# Patient Record
Sex: MALE | Race: WHITE | NOT HISPANIC OR LATINO | Employment: OTHER | URBAN - METROPOLITAN AREA
[De-identification: names, ages, dates, MRNs, and addresses within clinical notes are randomized per-mention and may not be internally consistent; named-entity substitution may affect disease eponyms.]

---

## 2022-03-15 DIAGNOSIS — R52 PAIN: Primary | ICD-10-CM

## 2022-03-15 RX ORDER — PREGABALIN 50 MG/1
50 CAPSULE ORAL 2 TIMES DAILY
Qty: 120 CAPSULE | Refills: 0 | Status: SHIPPED | OUTPATIENT
Start: 2022-03-15

## 2022-03-15 RX ORDER — PREGABALIN 50 MG/1
50 CAPSULE ORAL 2 TIMES DAILY
COMMUNITY
Start: 2022-02-04 | End: 2022-03-15

## 2022-03-16 ENCOUNTER — DOCUMENTATION ONLY (OUTPATIENT)
Dept: OTHER | Facility: CLINIC | Age: 77
End: 2022-03-16

## 2022-03-17 ENCOUNTER — TELEPHONE (OUTPATIENT)
Dept: GERIATRICS | Facility: CLINIC | Age: 77
End: 2022-03-17

## 2022-03-17 NOTE — TELEPHONE ENCOUNTER
[facility] ORDERS    Chief Complaint   Patient presents with     Orders     Lorenzo Prado is a 76 year old  (1945)    Orders:  Please change Acetaminophen to 1000 mg PO BID and BID PRN Dx: Pain  Please schedule Senna-S to 2 tablets PO BID Dx: Constipation    Electronically signed by:   RU Otoole CNP

## 2022-03-22 ENCOUNTER — TELEPHONE (OUTPATIENT)
Dept: GERIATRICS | Facility: CLINIC | Age: 77
End: 2022-03-22

## 2022-03-22 NOTE — TELEPHONE ENCOUNTER
[facility] ORDERS    Chief Complaint   Patient presents with     Orders     Lorenzo Prado  (1945)    ORDERS:    Shingrix Vaccine 0.5 ml IM x 1 Dx: Preventative    Electronically signed by:   RU Otoole CNP

## 2022-03-23 ENCOUNTER — ASSISTED LIVING VISIT (OUTPATIENT)
Dept: GERIATRICS | Facility: CLINIC | Age: 77
End: 2022-03-23
Payer: MEDICARE

## 2022-03-23 VITALS
DIASTOLIC BLOOD PRESSURE: 84 MMHG | SYSTOLIC BLOOD PRESSURE: 161 MMHG | HEART RATE: 93 BPM | RESPIRATION RATE: 18 BRPM | WEIGHT: 161 LBS | TEMPERATURE: 96.7 F

## 2022-03-23 DIAGNOSIS — K59.00 CONSTIPATION, UNSPECIFIED CONSTIPATION TYPE: ICD-10-CM

## 2022-03-23 DIAGNOSIS — I10 BENIGN ESSENTIAL HYPERTENSION: ICD-10-CM

## 2022-03-23 DIAGNOSIS — G25.0 ESSENTIAL TREMOR: ICD-10-CM

## 2022-03-23 DIAGNOSIS — E78.5 HYPERLIPIDEMIA, UNSPECIFIED HYPERLIPIDEMIA TYPE: ICD-10-CM

## 2022-03-23 DIAGNOSIS — Z00.00 PREVENTATIVE HEALTH CARE: ICD-10-CM

## 2022-03-23 DIAGNOSIS — M48.062 SPINAL STENOSIS OF LUMBAR REGION WITH NEUROGENIC CLAUDICATION: Primary | ICD-10-CM

## 2022-03-23 DIAGNOSIS — K21.00 GASTROESOPHAGEAL REFLUX DISEASE WITH ESOPHAGITIS, UNSPECIFIED WHETHER HEMORRHAGE: ICD-10-CM

## 2022-03-23 DIAGNOSIS — Z53.9 DIAGNOSIS NOT YET DEFINED: Primary | ICD-10-CM

## 2022-03-23 DIAGNOSIS — G47.33 OSA (OBSTRUCTIVE SLEEP APNEA): ICD-10-CM

## 2022-03-23 DIAGNOSIS — R53.81 PHYSICAL DECONDITIONING: ICD-10-CM

## 2022-03-23 DIAGNOSIS — Z98.890 S/P SPINAL SURGERY: ICD-10-CM

## 2022-03-23 PROCEDURE — G0180 MD CERTIFICATION HHA PATIENT: HCPCS | Performed by: NURSE PRACTITIONER

## 2022-03-23 RX ORDER — ACETAMINOPHEN 500 MG
1000 TABLET ORAL 2 TIMES DAILY
COMMUNITY

## 2022-03-23 RX ORDER — METHOCARBAMOL 500 MG/1
500 TABLET, FILM COATED ORAL 2 TIMES DAILY
COMMUNITY

## 2022-03-23 RX ORDER — TRIAMTERENE AND HYDROCHLOROTHIAZIDE 37.5; 25 MG/1; MG/1
1 CAPSULE ORAL EVERY MORNING
COMMUNITY

## 2022-03-23 RX ORDER — SENNOSIDES 8.6 MG
2 TABLET ORAL 2 TIMES DAILY
COMMUNITY
End: 2022-03-24

## 2022-03-23 RX ORDER — AMLODIPINE BESYLATE 5 MG/1
5 TABLET ORAL DAILY
COMMUNITY

## 2022-03-23 RX ORDER — CALCIUM CARBONATE 500 MG/1
2 TABLET, CHEWABLE ORAL 3 TIMES DAILY PRN
COMMUNITY

## 2022-03-23 RX ORDER — ATORVASTATIN CALCIUM 20 MG/1
20 TABLET, FILM COATED ORAL DAILY
COMMUNITY

## 2022-03-23 RX ORDER — NICOTINE POLACRILEX 4 MG/1
20 GUM, CHEWING ORAL
COMMUNITY

## 2022-03-23 RX ORDER — ACETAMINOPHEN 500 MG
1000 TABLET ORAL 2 TIMES DAILY PRN
COMMUNITY

## 2022-03-23 NOTE — LETTER
3/23/2022        RE: Lorenzo Prado  [facility]  [address]  [place] [address]        [facility]  PRIMARY CARE PROVIDER AND CLINIC:  Bettye Macario, RU CNP, [address] [facility] [address] / [place] [address]    Chief Complaint   Patient presents with     [facility] Medical Record Number:  [id number]  Place of Service where encounter took place:  [facility] [[id number]]    Lorenzo Prado  is a 76 year old  (1945), admitted to the above facility from home due to care needs related to recent surgery.      HPI:    This is a 76-year-old male, with a past medical history significant for lumbar spinal stenosis with radiculopathy, essential tremor, hypertension, prostate cancer s/p prostatectomy, GERD and hyperlipidemia, who was admitted to [facility] 2/17/22 through 2/24/22 for a posterior decompression of the lumbar spine at L4-S1. Hemoglobin 16.6 on 2/16/22 -> 14.3 on 2/18/22. Physical Therapy recommended a TCU stay for ongoing physical rehabilitation. Was discharged to [facility]CU in [place] from 2/24/22 through 3/15/22 before admitting to [facility] in [place] ([facility]) for a short-term stay.     Today, patient describes events leading up to hospitalization. Received multiple Neurosurgery consults before agreeing to undergo procedure at [facility]. Was in so much pain prior to surgery that he was receiving spinal injections, using a wheelchair and taking Dilaudid. Was hoping to make a faster recovery than he has as he talked to other people who had this surgery that did not require a TCU stay. Has found Physical Therapy to be beneficial. Worked on steps at the TCU. Walked around the building a longer distance than he had been and experienced increased pain. Spoke to PT who recommended taking it slower and gradually increasing his steps. Has been using a lumbar  tiffanie. Instructed to follow no BLT (bend, lift, twist) for 6 weeks. Had a sleep study in August that found 48 periods of apnea and uses a CPAP. Requests the water in his CPAP to be filled since he can't lift over 10 lbs during visit. Has 6 steps in his house that area small and he feels he will be able to manage. Is planning to discharge home to [place], [place] in the next couple of weeks when his son can drive him. Lives alone. Talks about his wife, their careers and travels together. Also would like his Shingrix shot today. Received the previous dose at [facility] on 9/29/21.    Feels he is cognitive able to manage his medications and would like to stop medication management through the facility as this is costing him a significant amount of money.    CODE STATUS/ADVANCE DIRECTIVES DISCUSSION:  No Order  CPR/Full code   ALLERGIES:   Allergies   Allergen Reactions     Animal Dander      Banana      Cats      Escitalopram      Latex      Lisinopril      Losartan      Propranolol       PAST MEDICAL HISTORY:   Past Medical History:   Diagnosis Date     Allergic rhinitis      Anxiety      Benign essential hypertension      Essential tremor      LEONORA (obstructive sleep apnea)     Sleep Study 8/21     Osteoarthritis of spine with radiculopathy, lumbosacral region      Other insomnia      Prostate cancer (H)       PAST SURGICAL HISTORY:   has a past surgical history that includes appendectomy; Prostatectomy (2011); rotator cuff repair rt/lt (Right); Release trigger finger (Left, 2019); and Posterior Laminectomy / Decompression Lumbar Spine (02/17/2022).  FAMILY HISTORY: family history is not on file.  SOCIAL HISTORY:   reports that he has quit smoking. He does not have any smokeless tobacco history on file.  Patient's living condition: lives in an assisted living facility    Post Discharge Medication Reconciliation Status: discharge medications reconciled, continue medications without change  Current Outpatient Medications    Medication Sig     acetaminophen (TYLENOL) 500 MG tablet Take 1,000 mg by mouth 2 times daily     acetaminophen (TYLENOL) 500 MG tablet Take 1,000 mg by mouth 2 times daily as needed for mild pain     amLODIPine (NORVASC) 5 MG tablet Take 5 mg by mouth daily     atorvastatin (LIPITOR) 20 MG tablet Take 20 mg by mouth daily     calcium carbonate (TUMS) 500 MG chewable tablet Take 2 chew tab by mouth 3 times daily as needed for heartburn     methocarbamol (ROBAXIN) 500 MG tablet Take 500 mg by mouth 2 times daily     omeprazole 20 MG tablet Take 20 mg by mouth nightly as needed     pregabalin (LYRICA) 50 MG capsule Take 1 capsule (50 mg) by mouth 2 times daily     sennosides (SENOKOT) 8.6 MG tablet Take 2 tablets by mouth 2 times daily     triamterene-HCTZ (DYAZIDE) 37.5-25 MG capsule Take 1 capsule by mouth every morning     Vitamin D3 (CHOLECALCIFEROL) 125 MCG (5000 UT) tablet Take 1 tablet by mouth daily     No current facility-administered medications for this visit.     ROS:  4 point ROS including Respiratory, CV, GI and , other than that noted in the HPI,  is negative    Vitals:  BP (!) 161/84   Pulse 93   Temp (!) 96.7  F (35.9  C)   Resp 18   Wt 73 kg (161 lb)   Exam:  GENERAL APPEARANCE:  Alert, in no distress  ENT:  Mouth and posterior oropharynx normal, moist mucous membranes  EYES:  EOM, conjunctivae, lids, pupils and irises normal  RESP:  no respiratory distress  SKIN:  Healing back incision  NEURO:   Cranial nerves 2-12 are normal tested and grossly at patient's baseline  PSYCH:  affect and mood normal    Lab/Diagnostic data:  Labs done in SNF are in [facility]. Please refer to them using [employer]/Care Everywhere.    ASSESSMENT/PLAN:  Lumbar Spinal Stenosis with Radiculopathy S/P Posterior Decompression of the Lumbar Spine at L4-S1. Patient reports he has post-operative appointment scheduled at [place]. Methocarbamol and Pregabalin ordered for pain control. Not on DVT prophylaxis given spinal  surgery. Home Physical Therapy ordered.    Hypertension and Hyperlipidemia. Taking Amlodipine, Triamterene-Hydrochlorothiazide and Atorvastatin as ordered.    Essential Tremor. Does not take any medications.    GERD. Continue Omeprazole and Tums as ordered.    Obstructive Sleep Apnea. Continue CPAP at home settings.     Constipation. Taking Senna-S and having bowel movements every other day. Typically has 2 bowel movements per day at home. Discussed bowel routines. Wrote down the name Miralax is patient wanted to take something additional for constipation outpatient.     Physical Deconditioning. Secondary to recent surgery and co-morbidities. Home Physical Therapy ordered. Home Occupational Therapy not indicated per patient report. When patient returns home, would prefer [facility] for home care.    Preventative Care. Administered Shingrix injection IM into left deltoid    Orders:    Patient has demonstrated he is cognitively able to administer his own medications and does not need medication management by Noreen Hutson    Electronically signed by:  RU Otoole CNP                   [facility] ORDERS    Chief Complaint   Patient presents with     [facility] Care     Lorenzo Prado is 76 year old  (1945)    Orders:    Patient has demonstrated he is cognitively able to administer his own medications and does not need medication management by Noreen Hutson    Electronically signed by:   RU Otoole CNP        Sincerely,        RU Otoole CNP

## 2022-03-23 NOTE — PROGRESS NOTES
[facility] GERIATRICS  PRIMARY CARE PROVIDER AND CLINIC:  Bettye Macario, RU CNP, [address]  / [place] [address]    Chief Complaint   Patient presents with     [facility] Medical Record Number:  [id number]  Place of Service where encounter took place:  [facility] [[id number]]    Lorenzo Prado  is a 76 year old  (1945), admitted to the above facility from home due to care needs related to recent surgery.      HPI:    This is a 76-year-old male, with a past medical history significant for lumbar spinal stenosis with radiculopathy, essential tremor, hypertension, prostate cancer s/p prostatectomy, GERD and hyperlipidemia, who was admitted to [facility] 2/17/22 through 2/24/22 for a posterior decompression of the lumbar spine at L4-S1. Hemoglobin 16.6 on 2/16/22 -> 14.3 on 2/18/22. Physical Therapy recommended a TCU stay for ongoing physical rehabilitation. Was discharged to [facility]CU in [place] from 2/24/22 through 3/15/22 before admitting to [facility] in [place] (Former[facility]) for a short-term stay.     Today, patient describes events leading up to hospitalization. Received multiple Neurosurgery consults before agreeing to undergo procedure at [facility]. Was in so much pain prior to surgery that he was receiving spinal injections, using a wheelchair and taking Dilaudid. Was hoping to make a faster recovery than he has as he talked to other people who had this surgery that did not require a TCU stay. Has found Physical Therapy to be beneficial. Worked on steps at the TCU. Walked around the building a longer distance than he had been and experienced increased pain. Spoke to PT who recommended taking it slower and gradually increasing his steps. Has been using a lumbar corsette. Instructed to follow no BLT (bend, lift, twist) for 6 weeks. Had a sleep study in August that found  48 periods of apnea and uses a CPAP. Requests the water in his CPAP to be filled since he can't lift over 10 lbs during visit. Has 6 steps in his house that area small and he feels he will be able to manage. Is planning to discharge home to [place], [place] in the next couple of weeks when his son can drive him. Lives alone. Talks about his wife, their careers and travels together. Also would like his Shingrix shot today. Received the previous dose at [facility] on 9/29/21.    Feels he is cognitive able to manage his medications and would like to stop medication management through the facility as this is costing him a significant amount of money.    CODE STATUS/ADVANCE DIRECTIVES DISCUSSION:  No Order  CPR/Full code   ALLERGIES:   Allergies   Allergen Reactions     Animal Dander      Banana      Cats      Escitalopram      Latex      Lisinopril      Losartan      Propranolol       PAST MEDICAL HISTORY:   Past Medical History:   Diagnosis Date     Allergic rhinitis      Anxiety      Benign essential hypertension      Essential tremor      LEONORA (obstructive sleep apnea)     Sleep Study 8/21     Osteoarthritis of spine with radiculopathy, lumbosacral region      Other insomnia      Prostate cancer (H)       PAST SURGICAL HISTORY:   has a past surgical history that includes appendectomy; Prostatectomy (2011); rotator cuff repair rt/lt (Right); Release trigger finger (Left, 2019); and Posterior Laminectomy / Decompression Lumbar Spine (02/17/2022).  FAMILY HISTORY: family history is not on file.  SOCIAL HISTORY:   reports that he has quit smoking. He does not have any smokeless tobacco history on file.  Patient's living condition: lives in an assisted living facility    Post Discharge Medication Reconciliation Status: discharge medications reconciled, continue medications without change  Current Outpatient Medications   Medication Sig     acetaminophen (TYLENOL) 500 MG tablet Take 1,000 mg by mouth 2 times daily      acetaminophen (TYLENOL) 500 MG tablet Take 1,000 mg by mouth 2 times daily as needed for mild pain     amLODIPine (NORVASC) 5 MG tablet Take 5 mg by mouth daily     atorvastatin (LIPITOR) 20 MG tablet Take 20 mg by mouth daily     calcium carbonate (TUMS) 500 MG chewable tablet Take 2 chew tab by mouth 3 times daily as needed for heartburn     methocarbamol (ROBAXIN) 500 MG tablet Take 500 mg by mouth 2 times daily     omeprazole 20 MG tablet Take 20 mg by mouth nightly as needed     pregabalin (LYRICA) 50 MG capsule Take 1 capsule (50 mg) by mouth 2 times daily     sennosides (SENOKOT) 8.6 MG tablet Take 2 tablets by mouth 2 times daily     triamterene-HCTZ (DYAZIDE) 37.5-25 MG capsule Take 1 capsule by mouth every morning     Vitamin D3 (CHOLECALCIFEROL) 125 MCG (5000 UT) tablet Take 1 tablet by mouth daily     No current facility-administered medications for this visit.     ROS:  4 point ROS including Respiratory, CV, GI and , other than that noted in the HPI,  is negative    Vitals:  BP (!) 161/84   Pulse 93   Temp (!) 96.7  F (35.9  C)   Resp 18   Wt 73 kg (161 lb)   Exam:  GENERAL APPEARANCE:  Alert, in no distress  ENT:  Mouth and posterior oropharynx normal, moist mucous membranes  EYES:  EOM, conjunctivae, lids, pupils and irises normal  RESP:  no respiratory distress  SKIN:  Healing back incision  NEURO:   Cranial nerves 2-12 are normal tested and grossly at patient's baseline  PSYCH:  affect and mood normal    Lab/Diagnostic data:  Labs done in SNF are in [facility]. Please refer to them using [facility]/Care Everywhere.    ASSESSMENT/PLAN:  Lumbar Spinal Stenosis with Radiculopathy S/P Posterior Decompression of the Lumbar Spine at L4-S1. Patient reports he has post-operative appointment scheduled at [place]. Methocarbamol and Pregabalin ordered for pain control. Not on DVT prophylaxis given spinal surgery. Home Physical Therapy ordered.    Hypertension and Hyperlipidemia. Taking Amlodipine,  Triamterene-Hydrochlorothiazide and Atorvastatin as ordered.    Essential Tremor. Does not take any medications.    GERD. Continue Omeprazole and Tums as ordered.    Obstructive Sleep Apnea. Continue CPAP at home settings.     Constipation. Taking Senna-S and having bowel movements every other day. Typically has 2 bowel movements per day at home. Discussed bowel routines. Wrote down the name Miralax is patient wanted to take something additional for constipation outpatient.     Physical Deconditioning. Secondary to recent surgery and co-morbidities. Home Physical Therapy ordered. Home Occupational Therapy not indicated per patient report. When patient returns home, would prefer [facility] for home care.    Preventative Care. Administered Shingrix injection IM into left deltoid    Orders:    Patient has demonstrated he is cognitively able to administer his own medications and does not need medication management by Noreen Hutson    Electronically signed by:  RU Otoole CNP

## 2022-03-24 ENCOUNTER — TELEPHONE (OUTPATIENT)
Dept: GERIATRICS | Facility: CLINIC | Age: 77
End: 2022-03-24
Payer: MEDICARE

## 2022-03-24 DIAGNOSIS — I10 BENIGN ESSENTIAL HYPERTENSION: ICD-10-CM

## 2022-03-24 DIAGNOSIS — M62.838 MUSCLE SPASM: ICD-10-CM

## 2022-03-24 DIAGNOSIS — R52 PAIN: Primary | ICD-10-CM

## 2022-03-24 DIAGNOSIS — Z78.9 TAKES DIETARY SUPPLEMENTS: ICD-10-CM

## 2022-03-24 DIAGNOSIS — E78.2 MIXED HYPERLIPIDEMIA: ICD-10-CM

## 2022-03-24 RX ORDER — AMLODIPINE BESYLATE 5 MG/1
5 TABLET ORAL DAILY
Qty: 90 TABLET | Refills: 3 | OUTPATIENT
Start: 2022-03-24

## 2022-03-24 RX ORDER — METHOCARBAMOL 500 MG/1
500 TABLET, FILM COATED ORAL 2 TIMES DAILY
Qty: 180 TABLET | Refills: 3 | OUTPATIENT
Start: 2022-03-24

## 2022-03-24 RX ORDER — AMOXICILLIN 250 MG
2 CAPSULE ORAL 2 TIMES DAILY
COMMUNITY

## 2022-03-24 RX ORDER — ATORVASTATIN CALCIUM 20 MG/1
20 TABLET, FILM COATED ORAL DAILY
Qty: 90 TABLET | Refills: 3 | OUTPATIENT
Start: 2022-03-24

## 2022-03-24 RX ORDER — ZOSTER VACCINE RECOMBINANT, ADJUVANTED 50 MCG/0.5
1 KIT INTRAMUSCULAR ONCE
Qty: 0.5 ML | Refills: 0 | Status: SHIPPED | OUTPATIENT
Start: 2022-03-24 | End: 2022-03-24

## 2022-03-24 RX ORDER — TRIAMTERENE AND HYDROCHLOROTHIAZIDE 37.5; 25 MG/1; MG/1
1 CAPSULE ORAL EVERY MORNING
Qty: 90 CAPSULE | Refills: 3 | OUTPATIENT
Start: 2022-03-24

## 2022-03-24 NOTE — PROGRESS NOTES
[facility] ORDERS    Chief Complaint   Patient presents with     [facility] Care     Lorenzo Prado is 76 year old  (1945)    Orders:    Patient has demonstrated he is cognitively able to administer his own medications and does not need medication management by Noreen Hutson    Electronically signed by:   RU Otoole CNP

## 2022-03-24 NOTE — TELEPHONE ENCOUNTER
[facility]     Chief Complaint   Patient presents with     Medication Issue     Lorenzo Prado is a 76 year old  (1945) On 3/23/22, after administering Shingrix vaccine on 3/23/22, examined vials and determined they were not previously reconstituted so patient received incorrect dose. Disclosed this to the patient on the telephone 3/24/22. Ordered new dosage of Shingrix from [facility] to be administered by facility DHS today.     Electronically signed by:   RU Otoole CNP

## 2022-04-09 ENCOUNTER — TELEPHONE (OUTPATIENT)
Dept: GERIATRICS | Facility: CLINIC | Age: 77
End: 2022-04-09
Payer: MEDICARE

## 2022-04-10 NOTE — TELEPHONE ENCOUNTER
[facility]     Name: Lorenzo Prado   : 1945     Patient discharged home to [place], [place] today with son. Has follow-up appointment with PCP on 22. Will obtain home care orders from PCP at appointment    Electronically signed by RU Otoole CNP